# Patient Record
Sex: FEMALE | Race: WHITE | NOT HISPANIC OR LATINO
[De-identification: names, ages, dates, MRNs, and addresses within clinical notes are randomized per-mention and may not be internally consistent; named-entity substitution may affect disease eponyms.]

---

## 2020-02-28 ENCOUNTER — APPOINTMENT (OUTPATIENT)
Dept: UROGYNECOLOGY | Facility: CLINIC | Age: 70
End: 2020-02-28
Payer: MEDICARE

## 2020-02-28 VITALS
HEART RATE: 77 BPM | WEIGHT: 77 LBS | SYSTOLIC BLOOD PRESSURE: 138 MMHG | BODY MASS INDEX: 14.54 KG/M2 | DIASTOLIC BLOOD PRESSURE: 78 MMHG | HEIGHT: 61 IN

## 2020-02-28 VITALS
HEART RATE: 77 BPM | SYSTOLIC BLOOD PRESSURE: 138 MMHG | WEIGHT: 170 LBS | HEIGHT: 61 IN | DIASTOLIC BLOOD PRESSURE: 88 MMHG | BODY MASS INDEX: 32.1 KG/M2

## 2020-02-28 DIAGNOSIS — Z82.49 FAMILY HISTORY OF ISCHEMIC HEART DISEASE AND OTHER DISEASES OF THE CIRCULATORY SYSTEM: ICD-10-CM

## 2020-02-28 DIAGNOSIS — Z83.42 FAMILY HISTORY OF FAMILIAL HYPERCHOLESTEROLEMIA: ICD-10-CM

## 2020-02-28 DIAGNOSIS — N81.11 CYSTOCELE, MIDLINE: ICD-10-CM

## 2020-02-28 DIAGNOSIS — K59.00 CONSTIPATION, UNSPECIFIED: ICD-10-CM

## 2020-02-28 DIAGNOSIS — N95.2 POSTMENOPAUSAL ATROPHIC VAGINITIS: ICD-10-CM

## 2020-02-28 DIAGNOSIS — N81.9 FEMALE GENITAL PROLAPSE, UNSPECIFIED: ICD-10-CM

## 2020-02-28 LAB
BILIRUB UR QL STRIP: NORMAL
CLARITY UR: CLEAR
COLLECTION METHOD: NORMAL
GLUCOSE UR-MCNC: NORMAL
HCG UR QL: 0.2 EU/DL
HGB UR QL STRIP.AUTO: NORMAL
KETONES UR-MCNC: NORMAL
LEUKOCYTE ESTERASE UR QL STRIP: NORMAL
NITRITE UR QL STRIP: NORMAL
PH UR STRIP: 5.5
PROT UR STRIP-MCNC: NORMAL
SP GR UR STRIP: 1.02

## 2020-02-28 PROCEDURE — 99204 OFFICE O/P NEW MOD 45 MIN: CPT

## 2020-02-28 PROCEDURE — 81003 URINALYSIS AUTO W/O SCOPE: CPT | Mod: QW

## 2020-02-28 RX ORDER — EZETIMIBE 10 MG/1
10 TABLET ORAL
Refills: 0 | Status: ACTIVE | COMMUNITY

## 2020-02-28 RX ORDER — AMLODIPINE BESYLATE 5 MG/1
TABLET ORAL
Refills: 0 | Status: ACTIVE | COMMUNITY

## 2020-02-28 NOTE — CHIEF COMPLAINT
[Questionnaire Received] : Patient questionnaire received [Recurrent Urinary Infections] : recurrent urinary infections [Bladder Pain] : bladder pain [Other ___] : [unfilled]

## 2020-02-28 NOTE — DISCUSSION/SUMMARY
[FreeTextEntry1] : I reviewed the above findings with the pt and her daughter. Visual illustrations were used to demonstrate prolapse. We reviewed management options for her prolapse including: observation, pelvic floor exercises with and without physical therapy, pessary, and surgical management. She is not interested in treatment at this time for her prolapse because she is not bothered by her symptoms. For her frequent UTIs, we discussed the use of vaginal estrogen as a preventive measure for recurrent UTI's. We also discussed behavioral modification techniques increasing hydration, cranberry supplements, D-mannose, probiotics. She declined use of vaginal estrogen. She reports a hx of chronic pelvic pain which was present today  and wanted to know if it was related to UTI. We discussed that urine dipstik was negative today with no evidence of a UTI and her pain was likely not related to UTI and I advised her to f/u with medical doctor for the pain..  IUGA information on prolapse was given to her. All questions were answered and she will f/u PRN. \par \par

## 2020-02-28 NOTE — HISTORY OF PRESENT ILLNESS
[Cystocele (Obstetric)] : none [Rectal Prolapse] : none [Unable To Restrain Bowel Movement] : none [Urinary Frequency] : none [Feelings Of Urinary Urgency] : none [x1] : once nightly [Urinary Tract Infection] : none [Constipation Obstructed Defecation] : none [de-identified] : w/ UTI [FreeTextEntry6] : H [de-identified] : Not sexually active [FreeTextEntry1] : \par 70yo presents for follow up. She was last here in  for recurrent UTIs. She was prescribed Nitrofurantoin ppx for 1 month and UTIs resolved. In the last year she reports having 3-4 UTIs. She has no urine cultures with her today. She is not sexually active and denies lose stools. She also reports her gyn diagnosed her with prolapse. She denies bothersome bulge. She denies urgency, frequency, or urinary incontinence. She also reports having abdominal pain that is intermittent, radiates to her back. Not always present with UTIs. \par \par PSH: hysterectomy due to fibroids,

## 2020-02-28 NOTE — PHYSICAL EXAM
[No Acute Distress] : in no acute distress [Well developed] : well developed [Well Nourished] : ~L well nourished [Warm and Dry] : was warm and dry to touch [Normal Gait] : gait was normal [Labia Majora] : were normal [Atrophy] : atrophy [Absent] : absent [Normal rectal exam] : was normal [Normal] : was normal [Aa ____] : Aa [unfilled] [Ba ____] : Ba [unfilled] [C ____] : C [unfilled] [GH ____] : GH [unfilled] [PB ____] : PB [unfilled] [TVL ____] : TVL  [unfilled] [Ap ____] : Ap [unfilled] [Bp ____] : Bp [unfilled] [D ____] : D [unfilled] [Oriented x3] : oriented to person, place, and time [Post Void Residual ____ml] : post void residual via catheterization was [unfilled] ml [Anxiety] : patient is not anxious [Bulbocavernous] : bulbocavernous was present [Anal Reflex] : the anal reflex was present [Tenderness] : ~T no ~M abdominal tenderness observed [Distended] : not distended [H/Smegaly] : no hepatosplenomegaly [Inguinal LAD] : no adenopathy was noted in the inguinal lymph nodes [Normal Appearance] : general appearance was normal [Uterine Adnexae] : were not tender and not enlarged [Cystocele] : a cystocele [FreeTextEntry4] : + VV prolapse